# Patient Record
Sex: FEMALE | Race: AMERICAN INDIAN OR ALASKA NATIVE
[De-identification: names, ages, dates, MRNs, and addresses within clinical notes are randomized per-mention and may not be internally consistent; named-entity substitution may affect disease eponyms.]

---

## 2019-03-19 ENCOUNTER — HOSPITAL ENCOUNTER (EMERGENCY)
Dept: HOSPITAL 5 - ED | Age: 49
Discharge: HOME | End: 2019-03-19
Payer: COMMERCIAL

## 2019-03-19 VITALS — SYSTOLIC BLOOD PRESSURE: 122 MMHG | DIASTOLIC BLOOD PRESSURE: 79 MMHG

## 2019-03-19 DIAGNOSIS — R10.2: Primary | ICD-10-CM

## 2019-03-19 DIAGNOSIS — Z88.8: ICD-10-CM

## 2019-03-19 DIAGNOSIS — N89.8: ICD-10-CM

## 2019-03-19 DIAGNOSIS — Z98.51: ICD-10-CM

## 2019-03-19 LAB
BACTERIA #/AREA URNS HPF: (no result) /HPF
BILIRUB UR QL STRIP: (no result)
BLOOD UR QL VISUAL: (no result)
MUCOUS THREADS #/AREA URNS HPF: (no result) /HPF
PH UR STRIP: 5 [PH] (ref 5–7)
RBC #/AREA URNS HPF: 10 /HPF (ref 0–6)
UROBILINOGEN UR-MCNC: 2 MG/DL (ref ?–2)
WBC #/AREA URNS HPF: < 1 /HPF (ref 0–6)

## 2019-03-19 PROCEDURE — 99284 EMERGENCY DEPT VISIT MOD MDM: CPT

## 2019-03-19 PROCEDURE — 81001 URINALYSIS AUTO W/SCOPE: CPT

## 2019-03-19 PROCEDURE — 76830 TRANSVAGINAL US NON-OB: CPT

## 2019-03-19 PROCEDURE — 87210 SMEAR WET MOUNT SALINE/INK: CPT

## 2019-03-19 PROCEDURE — 87591 N.GONORRHOEAE DNA AMP PROB: CPT

## 2019-03-19 PROCEDURE — 76856 US EXAM PELVIC COMPLETE: CPT

## 2019-03-19 PROCEDURE — 96372 THER/PROPH/DIAG INJ SC/IM: CPT

## 2019-03-19 NOTE — ULTRASOUND REPORT
PROCEDURE:  US pelvic complete 

 

TECHNIQUE:  Real-time abdominal sonography in multiple planes of the pelvis was performed with image 
documentation.   

 

HISTORY: pelvic pain, abnormal vaginal bleeding 

 

COMPARISONS:  None . 

 

FINDINGS: 

 

UTERUS 

Size: 5.7 x 2.3 x 4.3 cm. 

Endometrial thickness: 8 mm. 

Orientation: anteverted. 

Cervix: Normal. 

Fibroids/masses: None. 

 

The ovaries are not identified. 

 

Pelvic fluid: None. 

 

Other: None. 

 

IMPRESSION:  Normal uterus and endometrium. The ovaries are not identified. There is no free pelvic f
luid.. 

 

This document is electronically signed by Cheko Moran MD., March 19 2019 09:49:07 PM ET

## 2019-03-19 NOTE — EMERGENCY DEPARTMENT REPORT
Chief Complaint: Abdominal Pain


Stated Complaint: LT SIDE PAIN/DISCHARGE/VAGINAL BLEED


Time Seen by Provider: 03/19/19 18:17





- HPI


History of Present Illness: 





This is a 48 y.o. female that presents with abnormal vaginal bleeding and pelvic

cramping.  Patient reports vaginal cramping started 2 days ago and vaginal 

bleeding during intercourse last night.  She went through menopause in her late 

30's.  She also complains of vaginal discharge.





- ROS


Review of Systems: 





pelvic pain.





- Exam


Vital Signs: 


                                   Vital Signs











  03/19/19





  18:17


 


Temperature 98.1 F


 


Pulse Rate 70


 


Respiratory 18





Rate 


 


Blood Pressure 122/79


 


O2 Sat by Pulse 100





Oximetry 











MSE screening note: 


Focused history and physical exam performed.


Due to findings the following was ordered:





labs and US of pelvic





Fast track for further evaluation.





ED Disposition for MSE


Condition: Stable


Instructions:  Abdominal Pain (ED)

## 2019-03-19 NOTE — ULTRASOUND REPORT
PROCEDURE:  US TRANSVAGINAL 

 

TECHNIQUE:  Real-time transvaginal sonography in multiple planes of the pelvis was performed with lizzeth
ge documentation.   

 

HISTORY: pelvic pain, abnormal vaginal bleeding 

 

COMPARISONS:  None . 

 

FINDINGS: 

 

UTERUS 

Size: 5.7 x 2.3 x 4.3 cm. 

Endometrial thickness: 8 mm. 

Orientation: anteverted. 

Cervix: Normal. 

Fibroids/masses: None. 

 

The ovaries are not identified. 

 

Pelvic fluid: None. 

 

Other: None. 

 

IMPRESSION:  Normal uterus and endometrium. The ovaries are not identified. There is no free pelvic f
luid.. 

 

This document is electronically signed by Cheko Moran MD., March 19 2019 09:48:40 PM ET

## 2019-03-19 NOTE — EMERGENCY DEPARTMENT REPORT
ED Female  HPI





- General


Chief complaint: Abdominal Pain


Stated complaint: LT SIDE PAIN/DISCHARGE/VAGINAL BLEED


Time Seen by Provider: 03/19/19 18:17


Source: patient


Mode of arrival: Ambulatory


Limitations: No Limitations





- History of Present Illness


Initial comments: 





Pt is a 49 yo female who presents to the ED with c/o suprapubic abdominal 

cramping that began three days ago. She states she has associated white vaginal 

discharge which she states has a foul odor. The patient states that during 

intercourse with her partner she began to have vaginal bleeding. She has not 

experienced the bleeding since then. She states she went through menopause at 

the age of 36. She states it has been 3 years since she has seen a GYN. The 

patient states she does not use protection with her partner. 





- Related Data


                                  Previous Rx's











 Medication  Instructions  Recorded  Last Taken  Type


 


Ibuprofen [Motrin] 600 mg PO Q8H PRN #30 tablet 09/27/18 Unknown Rx











                                    Allergies











Allergy/AdvReac Type Severity Reaction Status Date / Time


 


bupivacaine HCl Allergy  Seizure Verified 04/01/16 11:03





[From Marcaine]     


 


propofol [From Diprivan] Allergy  Hives Verified 04/01/16 11:04














ED Review of Systems


ROS: 


Stated complaint: LT SIDE PAIN/DISCHARGE/VAGINAL BLEED


Other details as noted in HPI





Comment: All other systems reviewed and negative





ED Past Medical Hx





- Past Medical History


Previous Medical History?: Yes


Hx Kidney Stones: Yes


Additional medical history: uterine fibroids





- Surgical History


Past Surgical History?: Yes


Additional Surgical History: lithotripsy w/stent 2014, removed stent 2014\.  

tubal ligation





- Social History


Smoking Status: Never Smoker


Substance Use Type: None





- Medications


Home Medications: 


                                Home Medications











 Medication  Instructions  Recorded  Confirmed  Last Taken  Type


 


Ibuprofen [Motrin] 600 mg PO Q8H PRN #30 tablet 09/27/18  Unknown Rx














ED Physical Exam





- General


Limitations: No Limitations


General appearance: alert, in no apparent distress





- Head


Head exam: Present: atraumatic, normocephalic





- ENT


ENT exam: Present: mucous membranes moist





- Respiratory


Respiratory exam: Present: normal lung sounds bilaterally.  Absent: respiratory 

distress, wheezes, rales, rhonchi, stridor, chest wall tenderness, accessory 

muscle use, decreased breath sounds, prolonged expiratory





- Cardiovascular


Cardiovascular Exam: Present: regular rate, normal rhythm, normal heart sounds. 

 Absent: systolic murmur, rubs, gallop





- GI/Abdominal


GI/Abdominal exam: Present: soft, normal bowel sounds.  Absent: distended, 

tenderness, guarding, rebound, rigid





- 


External exam: Present: normal external exam.  Absent: erythema, swelling, 

lesions, lacerations, ecchymosis, bleeding


Speculum exam: Present: vaginal discharge (clear/white), cervical discharge 

(clear white cervical discharge), other (female RN present during pelvic 

examination ).  Absent: vaginal bleeding, foreign body, tissue, laceration


Bi-manual exam: Present: normal bi-manual exam.  Absent: cervical motion 

tendernes, adnexal tenderness, adnexal mass, uterine enlargement, uterine 

tenderness





- Neurological Exam


Neurological exam: Present: alert, oriented X3





- Psychiatric


Psychiatric exam: Present: normal affect, normal mood





ED Course


                                   Vital Signs











  03/19/19 03/19/19 03/19/19





  18:17 21:18 22:51


 


Temperature 98.1 F  


 


Pulse Rate 70  60


 


Respiratory 18 18 15





Rate   


 


Blood Pressure 122/79  


 


O2 Sat by Pulse 100  100





Oximetry   














ED Medical Decision Making





- Lab Data








                         Laboratory Results - last 24 hr











  03/19/19





  20:07


 


Urine Color  Yellow


 


Urine Turbidity  Clear


 


Urine pH  5.0


 


Ur Specific Gravity  1.033 H


 


Urine Protein  30 mg/dl


 


Urine Glucose (UA)  Neg


 


Urine Ketones  Neg


 


Urine Blood  Neg


 


Urine Nitrite  Neg


 


Urine Bilirubin  Neg


 


Urine Urobilinogen  2.0


 


Ur Leukocyte Esterase  Neg


 


Urine WBC (Auto)  < 1.0


 


Urine RBC (Auto)  10.0


 


U Epithel Cells (Auto)  5.0


 


Urine Bacteria (Auto)  1+


 


Urine Mucus  3+














- Radiology Data


Radiology results: report reviewed





HISTORY: pelvic pain, abnormal vaginal bleeding 





COMPARISONS: None . 





FINDINGS: 





UTERUS 


Size: 5.7 x 2.3 x 4.3 cm. 


Endometrial thickness: 8 mm. 


Orientation: anteverted. 


Cervix: Normal. 


Fibroids/masses: None. 





The ovaries are not identified. 





Pelvic fluid: None. 





Other: None. 





IMPRESSION: Normal uterus and endometrium. The ovaries are not identified. There

 is no free pelvic


fluid.. 





This document is electronically signed by Cheko Moran MD., March 19 2019 

09:48:40 PM ET 





- Medical Decision Making





Pt is a 49 yo female who presents to the ED with c/o suprapubic abdominal 

cramping that began three days ago. She states she has associated white vaginal 

discharge which she states has a foul odor. The patient states that during 

intercourse with her partner she began to have vaginal bleeding. She has not 

experienced the bleeding since then. She states she went through menopause at th

e age of 36. She states it has been 3 years since she has seen a GYN. The 

patient states she does not use protection with her partner. 


wet prep sent which is normal. UA is normal. Pt swabbed for G/C. Pt also treated

 in the ED for G/C. Advised pt that if she was concerned for other STDs she 

would need to be evaluated by GYN or health department. US transvaginal with no 

acute process. Will have pt follow up with GYN  in the next 2-3 regarding 

bleeding during intercourse. Also follow up with PCP in the next 2-3 days. 

Return to the ED for any new or worsening symptoms. 





- Differential Diagnosis


BV, Vaginal atrophy, yeast, trichomonas, STD, Uterine fibroids, Uterine CA


Critical care attestation.: 


If time is entered above; I have spent that time in minutes in the direct care 

of this critically ill patient, excluding procedure time.








ED Disposition


Clinical Impression: 


 Vaginal discharge, Pelvic pain





Disposition: DC-01 TO HOME OR SELFCARE


Is pt being admited?: No


Does the pt Need Aspirin: No


Condition: Stable


Instructions:  Safe Sex (ED), Chronic Pelvic Pain in Women (ED)


Additional Instructions: 


Follow up with a GYN in the next 2-3 days for further evaluation and management.

 Follow up with a primary care doctor in the next 2-3 days. If concerned for any

 other STDs please be seen by GYN or health department. Also have partner tested

 and treated as well. Do not engage in intercourse for 7-10 days. 


Referrals: 


[facility] [Primary Care Provider] - 2-3 Days


[facility] [Provider Group] - 2-3 Days


Time of Disposition: 22:38


Print Language: ENGLISH